# Patient Record
(demographics unavailable — no encounter records)

---

## 2025-03-06 NOTE — PLAN
[FreeTextEntry1] : annual: pap and hpv  breast imaging ordered will do colonoscopy this yr  having itching in area near right clitoris and I so see slight hypopigmentation d/w pt could be early lichen sclerosis? d/w pt potential biopsy but we can start a trial of clobetosol nighlty for a month mtc dose about 2 x a week needs f/u not later than 6 months

## 2025-03-06 NOTE — PHYSICAL EXAM
[Chaperone Present] : A chaperone was present in the examining room during all aspects of the physical examination [Appropriately responsive] : appropriately responsive [Alert] : alert [No Acute Distress] : no acute distress [No Lymphadenopathy] : no lymphadenopathy [Regular Rate Rhythm] : regular rate rhythm [Clear to Auscultation B/L] : clear to auscultation bilaterally [Soft] : soft [Non-tender] : non-tender [Non-distended] : non-distended [No HSM] : No HSM [No Lesions] : no lesions [No Mass] : no mass [Oriented x3] : oriented x3 [Examination Of The Breasts] : a normal appearance [No Masses] : no breast masses were palpable [Labia Majora] : normal [Atrophy] : atrophy [Normal] : normal [FreeTextEntry7] : no rebound/ no guarding [FreeTextEntry2] : *possilble slight hypopigmentation area of beggining lichen sclerosis? explained definitive dx with biopsy

## 2025-03-06 NOTE — HISTORY OF PRESENT ILLNESS
[Patient reported mammogram was normal] : Patient reported mammogram was normal [Patient reported breast sonogram was normal] : Patient reported breast sonogram was normal [Patient reported PAP Smear was normal] : Patient reported PAP Smear was normal [Patient reported colonoscopy was normal] : Patient reported colonoscopy was normal [Currently Active] : currently active [Men] : men [FreeTextEntry1] : Elida Quijano presents for well woman visit with gyn exam.  [Mammogramdate] : 2023 [PapSmeardate] : 2023 [ColonoscopyDate] : 12/2029 [TextBox_6] : occ hot flashes, I had written vaginal estrogens in past